# Patient Record
Sex: FEMALE | Race: WHITE | Employment: UNEMPLOYED | URBAN - NONMETROPOLITAN AREA
[De-identification: names, ages, dates, MRNs, and addresses within clinical notes are randomized per-mention and may not be internally consistent; named-entity substitution may affect disease eponyms.]

---

## 2020-08-13 ENCOUNTER — TELEPHONE (OUTPATIENT)
Dept: FAMILY MEDICINE CLINIC | Age: 45
End: 2020-08-13

## 2020-08-13 NOTE — TELEPHONE ENCOUNTER
1. Appt time and date. (give directions)   8/18/2020 @ 10am, REGLA Sousa  2. Arrive 15 min before appt. Confirmed   3. Please bring all medications to appt. Confirmed  4. Bring immunization record.   (if no record, which immunizations have you had and where?)  Confirmed

## 2020-08-19 ENCOUNTER — NURSE ONLY (OUTPATIENT)
Dept: LAB | Age: 45
End: 2020-08-19

## 2020-08-19 ENCOUNTER — OFFICE VISIT (OUTPATIENT)
Dept: FAMILY MEDICINE CLINIC | Age: 45
End: 2020-08-19

## 2020-08-19 VITALS
HEART RATE: 69 BPM | OXYGEN SATURATION: 99 % | WEIGHT: 194.8 LBS | TEMPERATURE: 97.9 F | SYSTOLIC BLOOD PRESSURE: 120 MMHG | HEIGHT: 63 IN | DIASTOLIC BLOOD PRESSURE: 68 MMHG | BODY MASS INDEX: 34.52 KG/M2 | RESPIRATION RATE: 16 BRPM

## 2020-08-19 PROBLEM — F31.9 AFFECTIVE PSYCHOSIS, BIPOLAR (HCC): Status: ACTIVE | Noted: 2020-08-19

## 2020-08-19 PROBLEM — F43.10 PTSD (POST-TRAUMATIC STRESS DISORDER): Status: ACTIVE | Noted: 2020-08-19

## 2020-08-19 PROBLEM — I10 ESSENTIAL HYPERTENSION: Status: ACTIVE | Noted: 2020-08-19

## 2020-08-19 PROBLEM — F17.210 CIGARETTE NICOTINE DEPENDENCE WITHOUT COMPLICATION: Status: ACTIVE | Noted: 2020-08-19

## 2020-08-19 PROBLEM — M06.9 RHEUMATOID ARTHRITIS INVOLVING MULTIPLE SITES (HCC): Status: ACTIVE | Noted: 2020-08-19

## 2020-08-19 PROBLEM — G43.109 MIGRAINE WITH AURA AND WITHOUT STATUS MIGRAINOSUS, NOT INTRACTABLE: Status: ACTIVE | Noted: 2020-08-19

## 2020-08-19 LAB
ALBUMIN SERPL-MCNC: 4.4 G/DL (ref 3.5–5.1)
ALP BLD-CCNC: 88 U/L (ref 38–126)
ALT SERPL-CCNC: 17 U/L (ref 11–66)
ANION GAP SERPL CALCULATED.3IONS-SCNC: 10 MEQ/L (ref 8–16)
AST SERPL-CCNC: 19 U/L (ref 5–40)
BASOPHILS # BLD: 1.6 %
BASOPHILS ABSOLUTE: 0.2 THOU/MM3 (ref 0–0.1)
BILIRUB SERPL-MCNC: 0.2 MG/DL (ref 0.3–1.2)
BUN BLDV-MCNC: 15 MG/DL (ref 7–22)
C-REACTIVE PROTEIN: 0.31 MG/DL (ref 0–1)
CALCIUM SERPL-MCNC: 9.1 MG/DL (ref 8.5–10.5)
CHLORIDE BLD-SCNC: 104 MEQ/L (ref 98–111)
CHOLESTEROL, FASTING: 126 MG/DL (ref 100–199)
CO2: 27 MEQ/L (ref 23–33)
CREAT SERPL-MCNC: 0.9 MG/DL (ref 0.4–1.2)
EOSINOPHIL # BLD: 5.3 %
EOSINOPHILS ABSOLUTE: 0.5 THOU/MM3 (ref 0–0.4)
ERYTHROCYTE [DISTWIDTH] IN BLOOD BY AUTOMATED COUNT: 12.6 % (ref 11.5–14.5)
ERYTHROCYTE [DISTWIDTH] IN BLOOD BY AUTOMATED COUNT: 47 FL (ref 35–45)
GFR SERPL CREATININE-BSD FRML MDRD: 67 ML/MIN/1.73M2
GLUCOSE BLD-MCNC: 76 MG/DL (ref 70–108)
HCT VFR BLD CALC: 44.7 % (ref 37–47)
HDLC SERPL-MCNC: 37 MG/DL
HEMOGLOBIN: 14.3 GM/DL (ref 12–16)
IMMATURE GRANS (ABS): 0.02 THOU/MM3 (ref 0–0.07)
IMMATURE GRANULOCYTES: 0.2 %
LDL CHOLESTEROL CALCULATED: 61 MG/DL
LYMPHOCYTES # BLD: 25.1 %
LYMPHOCYTES ABSOLUTE: 2.4 THOU/MM3 (ref 1–4.8)
MCH RBC QN AUTO: 31.9 PG (ref 26–33)
MCHC RBC AUTO-ENTMCNC: 32 GM/DL (ref 32.2–35.5)
MCV RBC AUTO: 99.8 FL (ref 81–99)
MONOCYTES # BLD: 7.7 %
MONOCYTES ABSOLUTE: 0.7 THOU/MM3 (ref 0.4–1.3)
NUCLEATED RED BLOOD CELLS: 0 /100 WBC
PLATELET # BLD: 366 THOU/MM3 (ref 130–400)
PMV BLD AUTO: 12.3 FL (ref 9.4–12.4)
POTASSIUM SERPL-SCNC: 3.8 MEQ/L (ref 3.5–5.2)
RBC # BLD: 4.48 MILL/MM3 (ref 4.2–5.4)
RHEUMATOID FACTOR: < 10 IU/ML (ref 0–13)
SEDIMENTATION RATE, ERYTHROCYTE: 8 MM/HR (ref 0–20)
SEG NEUTROPHILS: 60.1 %
SEGMENTED NEUTROPHILS ABSOLUTE COUNT: 5.8 THOU/MM3 (ref 1.8–7.7)
SODIUM BLD-SCNC: 141 MEQ/L (ref 135–145)
TOTAL CK: 210 U/L (ref 30–135)
TOTAL PROTEIN: 7.2 G/DL (ref 6.1–8)
TRIGLYCERIDE, FASTING: 140 MG/DL (ref 0–199)
TSH SERPL DL<=0.05 MIU/L-ACNC: 1.67 UIU/ML (ref 0.4–4.2)
URIC ACID: 4.4 MG/DL (ref 2.4–5.7)
WBC # BLD: 9.6 THOU/MM3 (ref 4.8–10.8)

## 2020-08-19 PROCEDURE — G8431 POS CLIN DEPRES SCRN F/U DOC: HCPCS | Performed by: NURSE PRACTITIONER

## 2020-08-19 PROCEDURE — G0444 DEPRESSION SCREEN ANNUAL: HCPCS | Performed by: NURSE PRACTITIONER

## 2020-08-19 PROCEDURE — 99204 OFFICE O/P NEW MOD 45 MIN: CPT | Performed by: NURSE PRACTITIONER

## 2020-08-19 RX ORDER — FLUOXETINE HYDROCHLORIDE 20 MG/1
20 CAPSULE ORAL DAILY
Qty: 30 CAPSULE | Refills: 5 | Status: SHIPPED | OUTPATIENT
Start: 2020-08-19 | End: 2020-09-17

## 2020-08-19 RX ORDER — MELOXICAM 7.5 MG/1
7.5 TABLET ORAL DAILY
Qty: 30 TABLET | Refills: 3 | Status: SHIPPED | OUTPATIENT
Start: 2020-08-19 | End: 2020-09-17

## 2020-08-19 RX ORDER — FLUOXETINE HYDROCHLORIDE 20 MG/1
40 CAPSULE ORAL DAILY
COMMUNITY
End: 2020-08-19 | Stop reason: SDUPTHER

## 2020-08-19 RX ORDER — PREDNISONE 10 MG/1
TABLET ORAL
Qty: 30 TABLET | Refills: 0 | Status: SHIPPED | OUTPATIENT
Start: 2020-08-19 | End: 2020-09-15 | Stop reason: SDUPTHER

## 2020-08-19 RX ORDER — ATENOLOL 50 MG/1
50 TABLET ORAL DAILY
COMMUNITY
End: 2020-08-19 | Stop reason: SDUPTHER

## 2020-08-19 RX ORDER — LISINOPRIL AND HYDROCHLOROTHIAZIDE 25; 20 MG/1; MG/1
1 TABLET ORAL DAILY
Qty: 30 TABLET | Refills: 5 | Status: SHIPPED | OUTPATIENT
Start: 2020-08-19 | End: 2020-12-29 | Stop reason: SDUPTHER

## 2020-08-19 RX ORDER — TOPIRAMATE 25 MG/1
25 TABLET ORAL 2 TIMES DAILY
Qty: 60 TABLET | Refills: 3 | Status: SHIPPED | OUTPATIENT
Start: 2020-08-19 | End: 2020-12-14 | Stop reason: SDUPTHER

## 2020-08-19 RX ORDER — HYDROXYZINE HYDROCHLORIDE 25 MG/1
25 TABLET, FILM COATED ORAL EVERY 6 HOURS PRN
Qty: 60 TABLET | Refills: 0 | Status: SHIPPED | OUTPATIENT
Start: 2020-08-19 | End: 2020-12-29 | Stop reason: SDUPTHER

## 2020-08-19 RX ORDER — TOPIRAMATE 100 MG/1
100 TABLET, FILM COATED ORAL DAILY
COMMUNITY
End: 2020-08-19 | Stop reason: SDUPTHER

## 2020-08-19 RX ORDER — OLANZAPINE 2.5 MG/1
2.5 TABLET ORAL NIGHTLY
Qty: 30 TABLET | Refills: 3 | Status: SHIPPED | OUTPATIENT
Start: 2020-08-19 | End: 2020-09-17

## 2020-08-19 RX ORDER — ATENOLOL 50 MG/1
50 TABLET ORAL DAILY
Qty: 90 TABLET | Refills: 3 | Status: SHIPPED | OUTPATIENT
Start: 2020-08-19 | End: 2020-12-29 | Stop reason: SDUPTHER

## 2020-08-19 ASSESSMENT — PATIENT HEALTH QUESTIONNAIRE - PHQ9
5. POOR APPETITE OR OVEREATING: 3
7. TROUBLE CONCENTRATING ON THINGS, SUCH AS READING THE NEWSPAPER OR WATCHING TELEVISION: 3
9. THOUGHTS THAT YOU WOULD BE BETTER OFF DEAD, OR OF HURTING YOURSELF: 0
10. IF YOU CHECKED OFF ANY PROBLEMS, HOW DIFFICULT HAVE THESE PROBLEMS MADE IT FOR YOU TO DO YOUR WORK, TAKE CARE OF THINGS AT HOME, OR GET ALONG WITH OTHER PEOPLE: 3
1. LITTLE INTEREST OR PLEASURE IN DOING THINGS: 3
6. FEELING BAD ABOUT YOURSELF - OR THAT YOU ARE A FAILURE OR HAVE LET YOURSELF OR YOUR FAMILY DOWN: 0
SUM OF ALL RESPONSES TO PHQ9 QUESTIONS 1 & 2: 6
8. MOVING OR SPEAKING SO SLOWLY THAT OTHER PEOPLE COULD HAVE NOTICED. OR THE OPPOSITE, BEING SO FIGETY OR RESTLESS THAT YOU HAVE BEEN MOVING AROUND A LOT MORE THAN USUAL: 0
SUM OF ALL RESPONSES TO PHQ QUESTIONS 1-9: 18
3. TROUBLE FALLING OR STAYING ASLEEP: 3
4. FEELING TIRED OR HAVING LITTLE ENERGY: 3
SUM OF ALL RESPONSES TO PHQ QUESTIONS 1-9: 18
2. FEELING DOWN, DEPRESSED OR HOPELESS: 3
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
## 2020-08-19 NOTE — PATIENT INSTRUCTIONS
the four sessions.   Tobacco cessation products are not included in the cost and are not provided by [facility].     [address] [place] [address] Puneet

## 2020-08-20 ENCOUNTER — TELEPHONE (OUTPATIENT)
Dept: FAMILY MEDICINE CLINIC | Age: 45
End: 2020-08-20

## 2020-08-21 LAB — CYCLIC CITRULLINATED PEPTIDE ANTIBODY IGG: < 1.5 U/ML

## 2020-08-22 LAB — ANA SCREEN: DETECTED

## 2020-08-25 ENCOUNTER — TELEPHONE (OUTPATIENT)
Dept: FAMILY MEDICINE CLINIC | Age: 45
End: 2020-08-25

## 2020-08-25 NOTE — TELEPHONE ENCOUNTER
----- Message from GOLDEN Cates CNP sent at 8/25/2020  7:43 AM EDT -----  Let Sona Esquivel know her NEL was positive. The NEL is often positive in rheumatological diseases.  There are some additional testing that will be done though on the NEL

## 2020-08-26 NOTE — TELEPHONE ENCOUNTER
Pt called back stating voiced understanding of NEL testing and awaiting results    Also wanted to note a previous question concerning her drinking habits. ..the patient denies consumption of alcohol other that maybe a glass of wine every 6 mo or so and even then she does not finish the glass.   PT denies drinking on a regular basis

## 2020-09-15 NOTE — TELEPHONE ENCOUNTER
Future Appointments   Date Time Provider Eunice Breen   9/17/2020  9:00 AM [address] [facility]       Recent Visits  Date Type Provider Dept   08/19/20 Office Visit Jenifer Aguirre, GOLDEN Powell CNP [facility]   Showing recent visits within past 540 days with a meds authorizing provider and meeting all other requirements     Future Appointments  Date Type Provider Dept   09/17/20 Appointment GOLDEN Duran CNP [facility]   Showing future appointments within next 150 days with a meds authorizing provider and meeting all other requirements

## 2020-09-16 RX ORDER — PREDNISONE 10 MG/1
10 TABLET ORAL DAILY
Qty: 30 TABLET | Refills: 0 | Status: SHIPPED | OUTPATIENT
Start: 2020-09-16 | End: 2020-11-18 | Stop reason: SDUPTHER

## 2020-09-17 ENCOUNTER — TELEPHONE (OUTPATIENT)
Dept: FAMILY MEDICINE CLINIC | Age: 45
End: 2020-09-17

## 2020-09-17 ENCOUNTER — NURSE ONLY (OUTPATIENT)
Dept: LAB | Age: 45
End: 2020-09-17

## 2020-09-17 ENCOUNTER — OFFICE VISIT (OUTPATIENT)
Dept: FAMILY MEDICINE CLINIC | Age: 45
End: 2020-09-17

## 2020-09-17 VITALS
HEIGHT: 63 IN | HEART RATE: 84 BPM | WEIGHT: 196.8 LBS | SYSTOLIC BLOOD PRESSURE: 128 MMHG | RESPIRATION RATE: 16 BRPM | OXYGEN SATURATION: 99 % | TEMPERATURE: 98.1 F | BODY MASS INDEX: 34.87 KG/M2 | DIASTOLIC BLOOD PRESSURE: 86 MMHG

## 2020-09-17 LAB
AMPHETAMINE+METHAMPHETAMINE URINE SCREEN: NEGATIVE
BARBITURATE QUANTITATIVE URINE: NEGATIVE
BENZODIAZEPINE QUANTITATIVE URINE: NEGATIVE
CANNABINOID QUANTITATIVE URINE: NEGATIVE
COCAINE METABOLITE QUANTITATIVE URINE: NEGATIVE
OPIATES, URINE: NEGATIVE
OXYCODONE: NEGATIVE
PHENCYCLIDINE QUANTITATIVE URINE: NEGATIVE
TOTAL CK: 169 U/L (ref 30–135)

## 2020-09-17 PROCEDURE — 99214 OFFICE O/P EST MOD 30 MIN: CPT | Performed by: NURSE PRACTITIONER

## 2020-09-17 RX ORDER — FLUOXETINE HYDROCHLORIDE 40 MG/1
40 CAPSULE ORAL DAILY
Qty: 30 CAPSULE | Refills: 3 | Status: SHIPPED | OUTPATIENT
Start: 2020-09-17 | End: 2020-12-29 | Stop reason: SDUPTHER

## 2020-09-17 RX ORDER — CLONAZEPAM 0.5 MG/1
0.5 TABLET ORAL 2 TIMES DAILY PRN
Qty: 30 TABLET | Refills: 0 | Status: SHIPPED | OUTPATIENT
Start: 2020-09-17 | End: 2020-11-18 | Stop reason: ALTCHOICE

## 2020-09-17 RX ORDER — MELOXICAM 15 MG/1
15 TABLET ORAL DAILY
Qty: 30 TABLET | Refills: 5 | Status: SHIPPED | OUTPATIENT
Start: 2020-09-17 | End: 2020-12-29 | Stop reason: SDUPTHER

## 2020-09-17 RX ORDER — OLANZAPINE 5 MG/1
5 TABLET ORAL NIGHTLY
Qty: 30 TABLET | Refills: 3 | Status: SHIPPED | OUTPATIENT
Start: 2020-09-17 | End: 2020-12-29 | Stop reason: SDUPTHER

## 2020-09-17 NOTE — TELEPHONE ENCOUNTER
----- Message from [employer] , APRN - CNP sent at 9/17/2020  2:27 PM EDT -----  Let Josesitoeliazar Menendez know her urine drug screen was clean. I am sending her Rx in for the klonopin.

## 2020-09-17 NOTE — TELEPHONE ENCOUNTER
----- Message from GOLDEN Hopper CNP sent at 9/17/2020  3:53 PM EDT -----  Let Liliam Kayy know the CK level is improved, down to 169. No further testing for this required.

## 2020-09-17 NOTE — PROGRESS NOTES
[facility] Medicine at [address] [facility], . [address]  Chief Complaint   Patient presents with    Follow-up     Pt presents for a 4 week f/u for chronic med conditions. She hasn't gotten the XR done. She hasn't seen a difference since she changed the bipolar meds. History obtained from chart review and the patient. SUBJECTIVE:  Donnal Dakin is a 50 y.o. female that presents today for follow up chronic conditions    Bipolar Disorder  Since last visit, she has started on Prozac 20 mg and Zyprexa 2.5 mg. Moods are not doing very well, increased irritability. She notes that the Zyprexa does make her sleepy, but she doesn't note any improvement. She has had about 2 manic episodes since last appt, but currently feeling depressed. The manic episodes have lasted for about 1 week. Denies any SI/HI. Denies any side effects at this point. She tried to have VV with [facility] but had technica difficulties. She is still struggling with high anxiety. She tried the Vistaril and it didn't seem to work. She feels like she would require the klonopin twice a day. RA  Previously diagnosed with RA by rheumatologist. She is originally from [place] area. She reports that she was on methotrexate and Humira injections in the past for RA. She lost insurance and then had difficulty paying for the medications. She reports that main joints affected are lower extremity, mostly hips, feet and ankles and low back. She has been taking Mobic for pain and she recently completed steroid taper. She did get relief with the steroids and she would like to get more steroids.     Age/Gender Health Maintenance    Lipid - 40  DM Screen - 40  Colon Cancer Screening - 48  Lung Cancer Screening (Age 54 to [de-identified] with 30 pack year hx, current smoker or quit within past 15 years) - n/a    Tetanus - needs  Influenza Vaccine - yearly  Pneumonia Vaccine - 65  Zostavax - 50   HPV Vaccine - n/a    Breast Cancer Screening - 50  Cervical Cancer Screening - 21  Osteoporosis Screening - 72  Chlamydia Screen - n/a    AAA Screening - n/a    Falls screening - n/a    Current Outpatient Medications   Medication Sig Dispense Refill    meloxicam (MOBIC) 15 MG tablet Take 1 tablet by mouth daily 30 tablet 5    OLANZapine (ZYPREXA) 5 MG tablet Take 1 tablet by mouth nightly 30 tablet 3    predniSONE (DELTASONE) 10 MG tablet Take 1 tablet by mouth daily 30 tablet 0    atenolol (TENORMIN) 50 MG tablet Take 1 tablet by mouth daily 90 tablet 3    FLUoxetine (PROZAC) 20 MG capsule Take 1 capsule by mouth daily 30 capsule 5    topiramate (TOPAMAX) 25 MG tablet Take 1 tablet by mouth 2 times daily 60 tablet 3    lisinopril-hydroCHLOROthiazide (PRINZIDE;ZESTORETIC) 20-25 MG per tablet Take 1 tablet by mouth daily 30 tablet 5    hydrOXYzine (ATARAX) 25 MG tablet Take 1 tablet by mouth every 6 hours as needed for Anxiety 60 tablet 0     No current facility-administered medications for this visit. Orders Placed This Encounter   Medications    meloxicam (MOBIC) 15 MG tablet     Sig: Take 1 tablet by mouth daily     Dispense:  30 tablet     Refill:  5    OLANZapine (ZYPREXA) 5 MG tablet     Sig: Take 1 tablet by mouth nightly     Dispense:  30 tablet     Refill:  3         All medications reviewed and reconciled, including OTC and herbal medications. Updated list given to patient.        Patient Active Problem List   Diagnosis    Affective psychosis, bipolar ([facility] [address]    PTSD (post-traumatic stress disorder)    Essential hypertension    Migraine with aura and without status migrainosus, not intractable    Cigarette nicotine dependence without complication    Rheumatoid arthritis involving multiple sites [facility])       Past Medical History:   Diagnosis Date    Anxiety     Bipolar disorder ([facility] [address]     Depression     Hypertension     Rheumatoid arteritis ([facility] [address]        Past Surgical History:   Procedure Laterality Date    HYSTERECTOMY, TOTAL ABDOMINAL No Known Allergies    Social History     Socioeconomic History    Marital status:      Spouse name: Not on file    Number of children: Not on file    Years of education: Not on file    Highest education level: Not on file   Occupational History    Not on file   Social Needs    Financial resource strain: Not on file    Food insecurity     Worry: Not on file     Inability: Not on file    Transportation needs     Medical: Not on file     Non-medical: Not on file   Tobacco Use    Smoking status: Current Every Day Smoker     Packs/day: 1.00     Years: 2.00     Pack years: 2.00     Types: Cigarettes     Start date: 8/19/2018    Smokeless tobacco: Never Used   Substance and Sexual Activity    Alcohol use: Not on file    Drug use: Not on file    Sexual activity: Not on file   Lifestyle    Physical activity     Days per week: Not on file     Minutes per session: Not on file    Stress: Not on file   Relationships    Social connections     Talks on phone: Not on file     Gets together: Not on file     Attends [employer] service: Not on file     Active member of club or organization: Not on file     Attends meetings of clubs or organizations: Not on file     Relationship status: Not on file    Intimate partner violence     Fear of current or ex partner: Not on file     Emotionally abused: Not on file     Physically abused: Not on file     Forced sexual activity: Not on file   Other Topics Concern    Not on file   Social History Narrative    Not on file       No family history on file. I have reviewed the patient's past medical history, past surgical history, allergies, medications, social and family history and I have made updates where appropriate.       Review of Systems  Positive responses are highlighted in bold    Constitutional:  Fever, Chills, Night Sweats, Fatigue, Unexpected changes in weight  Eyes:  Eye discharge, Eye pain, Eye redness, Visual disturbances   HENT:  Ear pain, Tinnitus, Nosebleeds, Trouble swallowing, Hearing loss, Sore throat  Cardiovascular:  Chest Pain, Palpitations, Orthopnea, Paroxysmal Nocturnal Dyspnea  Respiratory:  Cough, Wheezing, Shortness of breath, Chest tightness, Apnea  Gastrointestinal:  Nausea, Vomiting, Diarrhea, Constipation, Heartburn, Blood in stool  Genitourinary:  Difficulty or painful urination, Flank pain, Change in frequency, Urgency  Skin:  Color change, Rash, Itching, Wound  Psychiatric:  Hallucinations, Anxiety, Depression, Suicidal ideation  Hematological:  Enlarged glands, Easy bleeding, Easily bruising  Musculoskeletal:  Joint pain, Back pain, Gait problems, Joint swelling, Myalgias  Neurological:  Dizziness, Headaches, Presyncope, Numbness, Seizures, Tremors  Allergy:  Environmental allergies, Food allergies  Endocrine:  Heat Intolerance, Cold Intolerance, Polydipsia, Polyphagia, Polyuria    Lab Results   Component Value Date     08/19/2020    K 3.8 08/19/2020     08/19/2020    CO2 27 08/19/2020    BUN 15 08/19/2020    CREATININE 0.9 08/19/2020    GLUCOSE 76 08/19/2020    CALCIUM 9.1 08/19/2020    PROT 7.2 08/19/2020    LABALBU 4.4 08/19/2020    BILITOT 0.2 (L) 08/19/2020    ALKPHOS 88 08/19/2020    AST 19 08/19/2020    ALT 17 08/19/2020    LABGLOM 67 (A) 08/19/2020     Lab Results   Component Value Date    WBC 9.6 08/19/2020    HGB 14.3 08/19/2020    HCT 44.7 08/19/2020    MCV 99.8 (H) 08/19/2020     08/19/2020     Lab Results   Component Value Date    TSH 1.670 08/19/2020     CRP-0.31    Total CK-210    Sed rate-8  NEL-positive  Rheumatoid factor-<10  CC peptide IgG- < 1.5    PHYSICAL EXAM:  Vitals:    09/17/20 0911   BP: 128/86   Pulse: 84   Resp: 16   Temp: 98.1 °F (36.7 °C)   SpO2: 99%   Weight: 196 lb 12.8 oz (89.3 kg)   Height: 5' 2.5\" (1.588 m)     Body mass index is 35.42 kg/m².          VS Reviewed  General Appearance: A&O x 3, No acute distress,well developed and well- nourished  Head: normocephalic and atraumatic  Eyes: pupils equal, round, and reactive to light, extraocular eye movements intact, conjunctivae and eye lids without erythema  ENT: external ear and ear canal clear bilaterally, TMs intact and regular, nose without deformity, nasal mucosa and turbinates normal without polyps, oropharynx normal, dentition is normal for age  Neck: supple and non-tender without mass, no thyromegaly or thyroid nodules, no cervical lymphadenopathy  Pulmonary/Chest: clear to auscultation bilaterally- no wheezes, rales or rhonchi, normal air movement, no respiratory distress or retractions  Cardiovascular: S1 and S2 auscultated w/ RRR. No murmurs, rubs, clicks, or gallops, distal pulses intact. Abdomen: soft, non-tender, non-distended, bowl sounds physiologic,  no rebound or guarding, no masses or hernias noted. Liver and spleen without enlargement. Extremities: no cyanosis, clubbing or edema of the lower extremities  Musculoskeletal: No joint swelling or gross deformity   Neuro:  Alert, 5/5 strength globally and symmetrically  Psych: Affect and mood are flat and depressed. Thought process is normal without evidence of depression or psychosis. Good insight and appropriate interaction. Cognition and memory appear to be intact. Skin: warm and dry, no rash or erythema  Lymph:  No cervical, auricular or supraclavicular lymph nodes palpated      ASSESSMENT & PLAN  Ronny Cohen was seen today for follow-up. Diagnoses and all orders for this visit:    PTSD (post-traumatic stress disorder)  -     OLANZapine (ZYPREXA) 5 MG tablet; Take 1 tablet by mouth nightly    Bipolar disorder, current episode depressed, severe, without psychotic features ([address]  -     OLANZapine (ZYPREXA) 5 MG tablet; Take 1 tablet by mouth nightly    Rheumatoid arthritis involving multiple sites, unspecified rheumatoid factor presence (HCC)  -     XR LUMBAR SPINE (2-3 VIEWS); Future  -     meloxicam (MOBIC) 15 MG tablet;  Take 1 tablet by mouth daily    Chronic

## 2020-09-17 NOTE — PROGRESS NOTES
Controlled Substance Monitoring:    Acute and Chronic Pain Monitoring:   RX Monitoring 9/17/2020   Periodic Controlled Substance Monitoring No signs of potential drug abuse or diversion identified.

## 2020-09-19 LAB — ANA SCREEN: DETECTED

## 2020-11-17 ENCOUNTER — PATIENT MESSAGE (OUTPATIENT)
Dept: FAMILY MEDICINE CLINIC | Age: 45
End: 2020-11-17
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
## 2020-11-18 RX ORDER — PREDNISONE 10 MG/1
10 TABLET ORAL DAILY
Qty: 30 TABLET | Refills: 0 | Status: SHIPPED | OUTPATIENT
Start: 2020-11-18 | End: 2021-01-15 | Stop reason: ALTCHOICE

## 2020-11-18 RX ORDER — DIAZEPAM 2 MG/1
2 TABLET ORAL EVERY 12 HOURS PRN
Qty: 30 TABLET | Refills: 0 | Status: SHIPPED | OUTPATIENT
Start: 2020-11-18 | End: 2021-01-15 | Stop reason: SDUPTHER

## 2020-11-18 NOTE — TELEPHONE ENCOUNTER
Controlled Substance Monitoring:    Acute and Chronic Pain Monitoring:   RX Monitoring 11/18/2020   Periodic Controlled Substance Monitoring No signs of potential drug abuse or diversion identified.

## 2020-11-23 ENCOUNTER — VIRTUAL VISIT (OUTPATIENT)
Dept: FAMILY MEDICINE CLINIC | Age: 45
End: 2020-11-23

## 2020-11-23 ENCOUNTER — TELEPHONE (OUTPATIENT)
Dept: FAMILY MEDICINE CLINIC | Age: 45
End: 2020-11-23

## 2020-11-23 PROCEDURE — 99214 OFFICE O/P EST MOD 30 MIN: CPT | Performed by: NURSE PRACTITIONER

## 2020-11-23 RX ORDER — RABEPRAZOLE SODIUM 20 MG/1
20 TABLET, DELAYED RELEASE ORAL DAILY
Qty: 30 TABLET | Refills: 3 | Status: SHIPPED | OUTPATIENT
Start: 2020-11-23 | End: 2020-12-14 | Stop reason: ALTCHOICE

## 2020-11-23 RX ORDER — CHLORHEXIDINE GLUCONATE 0.12 MG/ML
15 RINSE ORAL 2 TIMES DAILY
Qty: 420 ML | Refills: 0 | Status: SHIPPED | OUTPATIENT
Start: 2020-11-23 | End: 2020-12-07

## 2020-11-23 RX ORDER — AMOXICILLIN 500 MG/1
500 CAPSULE ORAL 3 TIMES DAILY
Qty: 30 CAPSULE | Refills: 0 | Status: SHIPPED | OUTPATIENT
Start: 2020-11-23 | End: 2020-12-03

## 2020-11-23 ASSESSMENT — ENCOUNTER SYMPTOMS
TROUBLE SWALLOWING: 0
SORE THROAT: 0
EYE PAIN: 0
SHORTNESS OF BREATH: 0
RHINORRHEA: 0
DIARRHEA: 0
COLOR CHANGE: 0
COUGH: 0
VOMITING: 0
ABDOMINAL PAIN: 0
WHEEZING: 0
NAUSEA: 0
EYE REDNESS: 0

## 2020-11-23 NOTE — PROGRESS NOTES
immunocompromised state. Neurological: Negative for dizziness, tremors, syncope and headaches. Hematological: Negative. Negative for adenopathy. Psychiatric/Behavioral: Negative for behavioral problems, confusion, self-injury and suicidal ideas. All other systems reviewed and are negative. Prior to Visit Medications    Medication Sig Taking? Authorizing Provider   amoxicillin (AMOXIL) 500 MG capsule Take 1 capsule by mouth 3 times daily for 10 days Yes GOLDEN Segura CNP   Magic Mouthwash (MIRACLE MOUTHWASH) Swish and spit 5 mLs 4 times daily as needed for Dental Pain Equal parts of nystatin, lidocaine and nystatin Yes GOLDEN Segura CNP   chlorhexidine (PERIDEX) 0.12 % solution Take 15 mLs by mouth 2 times daily for 14 days Yes GOLDEN Segura CNP   RABEprazole (ACIPHEX) 20 MG tablet Take 1 tablet by mouth daily Yes GOLDEN Segura CNP   predniSONE (DELTASONE) 10 MG tablet Take 1 tablet by mouth daily  GOLDEN Segura CNP   diazePAM (VALIUM) 2 MG tablet Take 1 tablet by mouth every 12 hours as needed for Anxiety for up to 30 days.   GOLDEN Segura CNP   meloxicam (MOBIC) 15 MG tablet Take 1 tablet by mouth daily  GOLDEN Segura CNP   OLANZapine (ZYPREXA) 5 MG tablet Take 1 tablet by mouth nightly  GOLDEN Segura CNP   FLUoxetine (PROZAC) 40 MG capsule Take 1 capsule by mouth daily  GOLDEN Segura CNP   atenolol (TENORMIN) 50 MG tablet Take 1 tablet by mouth daily  GOLDEN Segura CNP   topiramate (TOPAMAX) 25 MG tablet Take 1 tablet by mouth 2 times daily  GOLDEN Segura CNP   lisinopril-hydroCHLOROthiazide (PRINZIDE;ZESTORETIC) 20-25 MG per tablet Take 1 tablet by mouth daily  GOLDEN Segura CNP   hydrOXYzine (ATARAX) 25 MG tablet Take 1 tablet by mouth every 6 hours as needed for Anxiety  GOLDEN Segura CNP       Social History     Tobacco Use    Smoking status: Current Every Day Smoker     Packs/day: 1.00     Years: 2.00     Pack years: 2.00     Types: Cigarettes     Start date: 8/19/2018    Smokeless tobacco: Never Used   Substance Use Topics    Alcohol use: Not on file    Drug use: Not on file        No Known Allergies,   Past Medical History:   Diagnosis Date    Anxiety     Bipolar disorder ([facility] [address]     Depression     Hypertension     Rheumatoid arteritis ([facility] [address]    ,   Past Surgical History:   Procedure Laterality Date    HYSTERECTOMY, TOTAL ABDOMINAL     , No family history on file.,   There is no immunization history on file for this patient.,   Health Maintenance   Topic Date Due    Pneumococcal 0-64 years Vaccine (1 of 1 - PPSV23) 07/17/1979    HIV screen  07/17/1988    DTaP/Tdap/Td vaccine (1 - Tdap) 07/17/1992    Flu vaccine (1) 09/01/2020    Potassium monitoring  08/19/2021    Creatinine monitoring  08/19/2021    Lipid screen  08/19/2025    Hepatitis A vaccine  Aged Out    Hepatitis B vaccine  Aged Out    Hib vaccine  Aged Out    Meningococcal (ACWY) vaccine  Aged Out       PHYSICAL EXAMINATION:  [ INSTRUCTIONS:  \"[x]\" Indicates a positive item  \"[]\" Indicates a negative item  -- DELETE ALL ITEMS NOT EXAMINED]  Vital Signs: (As obtained by patient/caregiver or practitioner observation)    Blood pressure-  Heart rate-    Respiratory rate-    Temperature-  Pulse oximetry-     Constitutional: [x] Appears well-developed and well-nourished [x] No apparent distress      [] Abnormal-   Mental status  [x] Alert and awake  [x] Oriented to person/place/time [x]Able to follow commands      Eyes:  EOM    [x]  Normal  [] Abnormal-  Sclera  [x]  Normal  [] Abnormal -         Discharge [x]  None visible  [] Abnormal -    HENT:   [x] Normocephalic, atraumatic.   [] Abnormal   [x] Mouth/Throat: Mucous membranes are moist.     External Ears [x] Normal  [] Abnormal-     Neck: [x] No visualized mass     Pulmonary/Chest: [x] Respiratory effort normal.  [x] No visualized signs of difficulty breathing or this being a TeleHealth encounter (During LAI-24 public health emergency), evaluation of the following organ systems was limited: Vitals/Constitutional/EENT/Resp/CV/GI//MS/Neuro/Skin/Heme-Lymph-Imm. Pursuant to the emergency declaration under the [address] [place] [address], [address] [place] [address] and the [employer] Act, this Virtual Visit was conducted with patient's (and/or legal guardian's) consent, to reduce the patient's risk of exposure to COVID-19 and provide necessary medical care. The patient (and/or legal guardian) has also been advised to contact this office for worsening conditions or problems, and seek emergency medical treatment and/or call 911 if deemed necessary. Services were provided through a video synchronous discussion virtually to substitute for in-person clinic visit. Patient and provider were located at their individual homes. --Daniella Osorio, GOLDEN - CNP on 11/23/2020 at 2:25 PM    An electronic signature was used to authenticate this note.

## 2020-11-25 ENCOUNTER — TELEPHONE (OUTPATIENT)
Dept: FAMILY MEDICINE CLINIC | Age: 45
End: 2020-11-25

## 2020-11-25 PROBLEM — R10.13 DYSPEPSIA: Status: ACTIVE | Noted: 2020-11-25

## 2020-11-25 RX ORDER — PANTOPRAZOLE SODIUM 40 MG/1
40 TABLET, DELAYED RELEASE ORAL
Qty: 30 TABLET | Refills: 5 | Status: SHIPPED | OUTPATIENT
Start: 2020-11-25 | End: 2020-12-14 | Stop reason: SDUPTHER

## 2020-11-25 NOTE — TELEPHONE ENCOUNTER
PA DENIAL RABEprazole (ACIPHEX) 20 MG tablet  11/25/20. Pt must try and fail (2) of the following for  Months : OMEPRAZOLE  CAPS ,PANTOPRAZOLE TABS, FIRST OMEPRAZOLE SUSPENSION, FIRST LANSOPRAZOLE SUSPENSION .      See attached for details

## 2020-12-04 ENCOUNTER — PATIENT MESSAGE (OUTPATIENT)
Dept: FAMILY MEDICINE CLINIC | Age: 45
End: 2020-12-04

## 2020-12-07 NOTE — TELEPHONE ENCOUNTER
From: Siena Oneal  To: Laclaudineeliazar Louis APRN - CNP  Sent: 12/4/2020 12:52 PM EST  Subject: Prescription Question    Hi Dr Yamil Hu you are doing well on this beautiful Friday afternoon. I am still feeling under the weather with a horrible cough and congested chest. I have tried to fight it off naturally, along with the antibiotics you ordered for my tooth. But I don't know if I am going to be able to get this stuff out of my lungs. I'm constantly coughing and spitting up very little flem. It's a very thick and deep consistency yellowish some white, some darker color. Haven't been tested for covid-19. N if I look down I can feel 2 knots on my throat. Last winter, I took breathing treatments at the er with similar symptoms in [facility] along w some other prescriptions. But I am afraid to go anywhere now.  Thanks so much [employer]

## 2020-12-08 ENCOUNTER — PATIENT MESSAGE (OUTPATIENT)
Dept: FAMILY MEDICINE CLINIC | Age: 45
End: 2020-12-08

## 2020-12-08 RX ORDER — AZITHROMYCIN 250 MG/1
250 TABLET, FILM COATED ORAL SEE ADMIN INSTRUCTIONS
Qty: 6 TABLET | Refills: 0 | Status: SHIPPED | OUTPATIENT
Start: 2020-12-08 | End: 2020-12-09 | Stop reason: SDUPTHER

## 2020-12-08 NOTE — TELEPHONE ENCOUNTER
From: Bree Arciniega  To: GOLDEN Navas CNP  Sent: 12/8/2020 2:07 PM EST  Subject: Prescription Question    I have one more question. Ya know I have not been feeling well. I've also been feeling dizzy. I just threw up about 10 min ago.y vision gets blurry n I throw up. I just checkedy bp and it's 173 over 110 pulse 104.       ----- Message -----   From:GOLDEN Jones CNP   Sent:12/8/2020 12:34 PM EST   To:Diana Talbot   Subject:RE: Prescription Question    Hi there Diana,    What I will do is send an antibiotic to your pharmacy. If you're not improving, or if you feel like you're worsening, I would definitely need to see you via a video visit. You should also quarantine in case this is COVID, recommend 10 days from onset of symptoms. Lise Hurtado      ----- Message -----   From:Diana Talbot   Sent:12/4/2020 12:52 PM EST   To:GOLDEN Jones CNP   Subject:Prescription Question    Hiren Riley you are doing well on this beautiful Friday afternoon. I am still feeling under the weather with a horrible cough and congested chest. I have tried to fight it off naturally, along with the antibiotics you ordered for my tooth. But I don't know if I am going to be able to get this stuff out of my lungs. I'm constantly coughing and spitting up very little flem. It's a very thick and deep consistency yellowish some white, some darker color. Haven't been tested for covid-19. N if I look down I can feel 2 knots on my throat. Last winter, I took breathing treatments at the er with similar symptoms in [facility] along w some other prescriptions. But I am afraid to go anywhere now.  Thanks so much [employer]

## 2020-12-09 ENCOUNTER — VIRTUAL VISIT (OUTPATIENT)
Dept: FAMILY MEDICINE CLINIC | Age: 45
End: 2020-12-09

## 2020-12-09 PROCEDURE — 99213 OFFICE O/P EST LOW 20 MIN: CPT | Performed by: NURSE PRACTITIONER

## 2020-12-09 RX ORDER — AZITHROMYCIN 250 MG/1
250 TABLET, FILM COATED ORAL SEE ADMIN INSTRUCTIONS
Qty: 6 TABLET | Refills: 0 | Status: SHIPPED | OUTPATIENT
Start: 2020-12-09 | End: 2020-12-14

## 2020-12-09 RX ORDER — ALBUTEROL SULFATE 90 UG/1
2 AEROSOL, METERED RESPIRATORY (INHALATION) 4 TIMES DAILY PRN
Qty: 1 INHALER | Refills: 1 | Status: SHIPPED | OUTPATIENT
Start: 2020-12-09 | End: 2020-12-29 | Stop reason: SDUPTHER

## 2020-12-09 RX ORDER — BENZONATATE 100 MG/1
100-200 CAPSULE ORAL 3 TIMES DAILY PRN
Qty: 42 CAPSULE | Refills: 0 | Status: SHIPPED | OUTPATIENT
Start: 2020-12-09 | End: 2020-12-16

## 2020-12-09 ASSESSMENT — ENCOUNTER SYMPTOMS
WHEEZING: 0
NAUSEA: 1
RHINORRHEA: 1
EYE REDNESS: 0
ABDOMINAL PAIN: 0
CHEST TIGHTNESS: 1
DIARRHEA: 0
TROUBLE SWALLOWING: 0
SHORTNESS OF BREATH: 0
SORE THROAT: 0
EYE PAIN: 0
VOMITING: 0
COLOR CHANGE: 0
COUGH: 1

## 2020-12-09 NOTE — PROGRESS NOTES
2020    TELEHEALTH EVALUATION -- Audio/Visual (During -04 public health emergency)    HPI:    Elen Genao (:  1975) has requested an audio/video evaluation for the following concern(s):    URI Symptoms    HPI:      Symptoms have been present for 2 week(s). Symptoms are unchanged since they initially started. She has lost her sense of taste and smell about 1 week ago. She was having a lot of dizziness yesterday and today. She feels like she is spinning and the room is spinning. She checked her BP and it was actually 173/143    Fever? No  Runny nose or congestion? Yes   Cough? Yes - productive cough  Sore throat? Yes  Headache, fatigue, joint pains, muscle aches? Yes  Shortness of breath/Wheezing? No  Nausea/Vomiting/Diarrhea? Yes - nausea  Double Sickening? No  Sick contacts? Yes - sister sick, other family members also sick. None of them have been tested with COVID though. She also has pain in her right side when she coughs or moves. Patient has tried OTC meds without improvement. Review of Systems   Constitutional: Positive for chills, diaphoresis, fatigue and fever. HENT: Positive for congestion and rhinorrhea. Negative for sore throat and trouble swallowing. Eyes: Negative for pain, redness and visual disturbance. Respiratory: Positive for cough and chest tightness. Negative for shortness of breath and wheezing. Cardiovascular: Positive for chest pain. Negative for palpitations and leg swelling. Gastrointestinal: Positive for nausea. Negative for abdominal pain, diarrhea and vomiting. Endocrine: Negative for polydipsia, polyphagia and polyuria. Genitourinary: Negative for decreased urine volume, dysuria, frequency and urgency. Musculoskeletal: Negative for arthralgias and myalgias. Skin: Negative for color change and rash. Allergic/Immunologic: Negative for environmental allergies, food allergies and immunocompromised state.    Neurological: Positive for dizziness and headaches. Negative for tremors and syncope. Hematological: Negative. Negative for adenopathy. Psychiatric/Behavioral: Negative for behavioral problems, confusion, self-injury and suicidal ideas. All other systems reviewed and are negative. Prior to Visit Medications    Medication Sig Taking? Authorizing Provider   benzonatate (TESSALON) 100 MG capsule Take 1-2 capsules by mouth 3 times daily as needed for Cough Yes GOLDEN Lozano CNP   azithromycin (ZITHROMAX) 250 MG tablet Take 1 tablet by mouth See Admin Instructions for 5 days 500mg on day 1 followed by 250mg on days 2 - 5 Yes GOLDEN Lozano CNP   albuterol sulfate HFA (VENTOLIN HFA) 108 (90 Base) MCG/ACT inhaler Inhale 2 puffs into the lungs 4 times daily as needed for Wheezing Yes GOLDEN Lozano CNP   pantoprazole (PROTONIX) 40 MG tablet Take 1 tablet by mouth every morning (before breakfast)  GOLDEN Lozano CNP   RABEprazole (ACIPHEX) 20 MG tablet Take 1 tablet by mouth daily  GOLDEN Lozano CNP   Magic Mouthwash (MIRACLE MOUTHWASH) Swish and spit 5 mLs 4 times daily as needed for Dental Pain Dispense equal parts of benadryl, lidocaine and nystatin  GOLDEN Lozano CNP   predniSONE (DELTASONE) 10 MG tablet Take 1 tablet by mouth daily  GOLDEN Lozano CNP   diazePAM (VALIUM) 2 MG tablet Take 1 tablet by mouth every 12 hours as needed for Anxiety for up to 30 days.   GOLDEN Lozano CNP   meloxicam (MOBIC) 15 MG tablet Take 1 tablet by mouth daily  GOLDEN Lozano CNP   OLANZapine (ZYPREXA) 5 MG tablet Take 1 tablet by mouth nightly  GOLDEN Lozano CNP   FLUoxetine (PROZAC) 40 MG capsule Take 1 capsule by mouth daily  GOLDEN Lozano CNP   atenolol (TENORMIN) 50 MG tablet Take 1 tablet by mouth daily  GOLDEN Lozano CNP   topiramate (TOPAMAX) 25 MG tablet Take 1 tablet by mouth 2 times daily  GOLDEN Lozano CNP [] Abnormal -         Discharge [x]  None visible  [] Abnormal -    HENT:   [x] Normocephalic, atraumatic. [] Abnormal   [x] Mouth/Throat: Mucous membranes are moist.     External Ears [x] Normal  [] Abnormal-     Neck: [x] No visualized mass     Pulmonary/Chest: [x] Respiratory effort normal.  [x] No visualized signs of difficulty breathing or respiratory distress        [x] Abnormal- harsh frequent barky cough     Musculoskeletal:   [x] Normal gait with no signs of ataxia         [x] Normal range of motion of neck        [] Abnormal-       Neurological:        [x] No Facial Asymmetry (Cranial nerve 7 motor function) (limited exam to video visit)          [x] No gaze palsy        [] Abnormal-         Skin:        [x] No significant exanthematous lesions or discoloration noted on facial skin         [] Abnormal-            Psychiatric:       [x] Normal Affect [x] No Hallucinations        [] Abnormal-     Other pertinent observable physical exam findings-     ASSESSMENT/PLAN:  1. Acute bronchitis, unspecified organism  - benzonatate (TESSALON) 100 MG capsule; Take 1-2 capsules by mouth 3 times daily as needed for Cough  Dispense: 42 capsule; Refill: 0  - XR CHEST STANDARD (2 VW); Future  - azithromycin (ZITHROMAX) 250 MG tablet; Take 1 tablet by mouth See Admin Instructions for 5 days 500mg on day 1 followed by 250mg on days 2 - 5  Dispense: 6 tablet; Refill: 0  - albuterol sulfate HFA (VENTOLIN HFA) 108 (90 Base) MCG/ACT inhaler; Inhale 2 puffs into the lungs 4 times daily as needed for Wheezing  Dispense: 1 Inhaler; Refill: 1    2. Suspected COVID-19 virus infection  - she declines testing for COVID  - recommend she con't to quarantine at this point since she is still significantly symptomatic.  - recommend quarantine be lifted when her symptoms start improving      Return if symptoms worsen or fail to improve.     Valerio Crabtree is a 50 y.o. female being evaluated by a Virtual Visit (video visit) encounter to address concerns as mentioned above. A caregiver was present when appropriate. Due to this being a TeleHealth encounter (During RGAQK-93 public health emergency), evaluation of the following organ systems was limited: Vitals/Constitutional/EENT/Resp/CV/GI//MS/Neuro/Skin/Heme-Lymph-Imm. Pursuant to the emergency declaration under the [address] [place] [address], [address] [place] [address] authority and the [employer] Act, this Virtual Visit was conducted with patient's (and/or legal guardian's) consent, to reduce the patient's risk of exposure to COVID-19 and provide necessary medical care. The patient (and/or legal guardian) has also been advised to contact this office for worsening conditions or problems, and seek emergency medical treatment and/or call 911 if deemed necessary. Services were provided through a video synchronous discussion virtually to substitute for in-person clinic visit. Patient and provider were located at their individual homes. --GOLDEN Pena CNP on 12/9/2020 at 11:56 AM    An electronic signature was used to authenticate this note.

## 2020-12-14 ENCOUNTER — PATIENT MESSAGE (OUTPATIENT)
Dept: FAMILY MEDICINE CLINIC | Age: 45
End: 2020-12-14

## 2020-12-14 RX ORDER — PROMETHAZINE HYDROCHLORIDE 25 MG/1
25 TABLET ORAL 4 TIMES DAILY PRN
Qty: 20 TABLET | Refills: 0 | Status: SHIPPED | OUTPATIENT
Start: 2020-12-14 | End: 2020-12-21

## 2020-12-14 RX ORDER — IBUPROFEN 800 MG/1
800 TABLET ORAL 4 TIMES DAILY PRN
Qty: 120 TABLET | Refills: 0 | Status: SHIPPED | OUTPATIENT
Start: 2020-12-14 | End: 2020-12-29 | Stop reason: SDUPTHER

## 2020-12-14 RX ORDER — TOPIRAMATE 25 MG/1
25 TABLET ORAL 2 TIMES DAILY
Qty: 60 TABLET | Refills: 3 | Status: SHIPPED | OUTPATIENT
Start: 2020-12-14 | End: 2020-12-29 | Stop reason: SDUPTHER

## 2020-12-14 RX ORDER — PANTOPRAZOLE SODIUM 40 MG/1
40 TABLET, DELAYED RELEASE ORAL
Qty: 30 TABLET | Refills: 5 | Status: SHIPPED | OUTPATIENT
Start: 2020-12-14 | End: 2020-12-29 | Stop reason: SDUPTHER

## 2020-12-14 RX ORDER — PREDNISONE 20 MG/1
40 TABLET ORAL DAILY
Qty: 14 TABLET | Refills: 0 | Status: SHIPPED | OUTPATIENT
Start: 2020-12-14 | End: 2021-01-15 | Stop reason: SDUPTHER

## 2020-12-14 NOTE — TELEPHONE ENCOUNTER
From: Bree Arciniega  To: GOLDEN Navas CNP  Sent: 12/14/2020 3:29 PM EST  Subject: Prescription Question    Hey one more question. The stomach meds weren't approved. Can we try for the protonix      ----- Message -----   From:GOLDEN Jones CNP   Sent:12/14/2020 10:48 AM EST   To:Diana Talbot   Subject:RE: Prescription Question    That is fine. I have sent your prescriptions to the pharmacy. Hope you get to feeling better. ----- Message -----   From:Diana Talbot   Sent:12/14/2020 9:11 AM EST   To:GOLDEN Jones CNP   Subject:Prescription Question    Hi Dr. Lise Hurtado, I am just wondering if you will call me in some 800 mg motrin to [facility] for the pain in my rib cage. And some steroids. And phenergran.  Thanks so much

## 2020-12-29 ENCOUNTER — PATIENT MESSAGE (OUTPATIENT)
Dept: FAMILY MEDICINE CLINIC | Age: 45
End: 2020-12-29

## 2020-12-29 RX ORDER — BUPROPION HYDROCHLORIDE 150 MG/1
150 TABLET, EXTENDED RELEASE ORAL 2 TIMES DAILY
Qty: 60 TABLET | Refills: 3 | Status: SHIPPED | OUTPATIENT
Start: 2020-12-29

## 2020-12-29 NOTE — TELEPHONE ENCOUNTER
From: Jose A King  To: Claude Members, APRN - CNP  Sent: 12/29/2020 11:25 AM EST  Subject: Prescription Question    Just wondering if you were going to send wellbutrin in for the smoking cessation before I make a trip to [address] [facility].  Thanks

## 2020-12-31 ENCOUNTER — TELEPHONE (OUTPATIENT)
Dept: FAMILY MEDICINE CLINIC | Age: 45
End: 2020-12-31

## 2020-12-31 RX ORDER — FLUCONAZOLE 150 MG/1
150 TABLET ORAL
Qty: 2 TABLET | Refills: 0 | Status: SHIPPED | OUTPATIENT
Start: 2020-12-31 | End: 2021-01-06

## 2021-01-15 ENCOUNTER — PATIENT MESSAGE (OUTPATIENT)
Dept: FAMILY MEDICINE CLINIC | Age: 46
End: 2021-01-15

## 2021-01-15 DIAGNOSIS — R07.89 CHEST WALL PAIN: ICD-10-CM

## 2021-01-15 DIAGNOSIS — J20.9 ACUTE BRONCHITIS, UNSPECIFIED ORGANISM: ICD-10-CM

## 2021-01-15 DIAGNOSIS — F43.10 PTSD (POST-TRAUMATIC STRESS DISORDER): ICD-10-CM

## 2021-01-15 RX ORDER — PREDNISONE 20 MG/1
40 TABLET ORAL DAILY
Qty: 14 TABLET | Refills: 0 | Status: SHIPPED | OUTPATIENT
Start: 2021-01-15 | End: 2021-01-22

## 2021-01-15 RX ORDER — DIAZEPAM 2 MG/1
2 TABLET ORAL EVERY 12 HOURS PRN
Qty: 30 TABLET | Refills: 0 | Status: SHIPPED | OUTPATIENT
Start: 2021-01-15 | End: 2021-02-14

## 2021-01-15 NOTE — TELEPHONE ENCOUNTER
Controlled Substance Monitoring:    Acute and Chronic Pain Monitoring:   RX Monitoring 1/15/2021   Periodic Controlled Substance Monitoring No signs of potential drug abuse or diversion identified.

## 2021-01-29 ENCOUNTER — PATIENT MESSAGE (OUTPATIENT)
Dept: FAMILY MEDICINE CLINIC | Age: 46
End: 2021-01-29

## 2021-01-29 NOTE — TELEPHONE ENCOUNTER
From: Parveen Reid  To: GOLDEN Paiz - CNP  Sent: 1/29/2021 12:28 PM EST  Subject: Prescription Question    Hi Dr. Georgia Garcia, I don't know what is going on with the diazepam. But it's not helping me like I have been prescribed in time past. I'm having anxiety attacks and trouble sleeping. And I have been breaking out in hives. Life has been more stressful over the past little bit. Would you consider raising the dosage?  Thanks

## 2021-02-02 ENCOUNTER — VIRTUAL VISIT (OUTPATIENT)
Dept: FAMILY MEDICINE CLINIC | Age: 46
End: 2021-02-02

## 2021-02-02 DIAGNOSIS — F31.4 BIPOLAR DISORDER, CURRENT EPISODE DEPRESSED, SEVERE, WITHOUT PSYCHOTIC FEATURES (HCC): ICD-10-CM

## 2021-02-02 DIAGNOSIS — F43.10 PTSD (POST-TRAUMATIC STRESS DISORDER): Primary | ICD-10-CM

## 2021-02-02 PROCEDURE — 99442 PR PHYS/QHP TELEPHONE EVALUATION 11-20 MIN: CPT | Performed by: NURSE PRACTITIONER

## 2021-02-02 RX ORDER — FLUOXETINE HYDROCHLORIDE 20 MG/1
20 CAPSULE ORAL DAILY
Qty: 90 CAPSULE | Refills: 0 | Status: SHIPPED | OUTPATIENT
Start: 2021-02-02

## 2021-02-02 RX ORDER — DIAZEPAM 5 MG/1
5 TABLET ORAL EVERY 12 HOURS PRN
Qty: 60 TABLET | Refills: 0 | Status: SHIPPED | OUTPATIENT
Start: 2021-02-02 | End: 2021-04-14 | Stop reason: SDUPTHER
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
## 2021-03-17 ENCOUNTER — TELEPHONE (OUTPATIENT)
Dept: FAMILY MEDICINE CLINIC | Age: 46
End: 2021-03-17

## 2021-03-17 NOTE — TELEPHONE ENCOUNTER
2nd attempt to contact the pt re:overdue Xrays AC ordered on 8/19/20, 9/17/20, & 12/9/20. HIPAA form is up to date, orders mailed.

## 2021-04-09 DIAGNOSIS — F43.10 PTSD (POST-TRAUMATIC STRESS DISORDER): ICD-10-CM

## 2021-04-09 DIAGNOSIS — G43.109 MIGRAINE WITH AURA AND WITHOUT STATUS MIGRAINOSUS, NOT INTRACTABLE: ICD-10-CM

## 2021-04-09 DIAGNOSIS — I10 ESSENTIAL HYPERTENSION: ICD-10-CM

## 2021-04-09 DIAGNOSIS — J20.9 ACUTE BRONCHITIS, UNSPECIFIED ORGANISM: ICD-10-CM

## 2021-04-09 DIAGNOSIS — R07.89 CHEST WALL PAIN: ICD-10-CM

## 2021-04-09 DIAGNOSIS — R10.13 DYSPEPSIA: ICD-10-CM

## 2021-04-09 DIAGNOSIS — F31.4 BIPOLAR DISORDER, CURRENT EPISODE DEPRESSED, SEVERE, WITHOUT PSYCHOTIC FEATURES (HCC): ICD-10-CM

## 2021-04-09 RX ORDER — ALBUTEROL SULFATE 90 UG/1
AEROSOL, METERED RESPIRATORY (INHALATION)
Qty: 1 INHALER | Refills: 2 | Status: SHIPPED | OUTPATIENT
Start: 2021-04-09

## 2021-04-09 RX ORDER — IBUPROFEN 800 MG/1
TABLET ORAL
Qty: 120 TABLET | Refills: 1 | Status: SHIPPED | OUTPATIENT
Start: 2021-04-09

## 2021-04-09 RX ORDER — TOPIRAMATE 25 MG/1
25 TABLET ORAL 2 TIMES DAILY
Qty: 180 TABLET | Refills: 1 | Status: SHIPPED | OUTPATIENT
Start: 2021-04-09

## 2021-04-09 RX ORDER — OLANZAPINE 5 MG/1
5 TABLET ORAL NIGHTLY
Qty: 90 TABLET | Refills: 1 | Status: SHIPPED | OUTPATIENT
Start: 2021-04-09

## 2021-04-09 RX ORDER — FLUOXETINE HYDROCHLORIDE 40 MG/1
40 CAPSULE ORAL DAILY
Qty: 90 CAPSULE | Refills: 1 | Status: SHIPPED | OUTPATIENT
Start: 2021-04-09

## 2021-04-09 RX ORDER — LISINOPRIL AND HYDROCHLOROTHIAZIDE 25; 20 MG/1; MG/1
1 TABLET ORAL DAILY
Qty: 90 TABLET | Refills: 1 | Status: SHIPPED | OUTPATIENT
Start: 2021-04-09

## 2021-04-09 RX ORDER — PANTOPRAZOLE SODIUM 40 MG/1
40 TABLET, DELAYED RELEASE ORAL
Qty: 90 TABLET | Refills: 1 | Status: SHIPPED | OUTPATIENT
Start: 2021-04-09

## 2021-04-14 ENCOUNTER — TELEPHONE (OUTPATIENT)
Dept: FAMILY MEDICINE CLINIC | Age: 46
End: 2021-04-14

## 2021-04-14 DIAGNOSIS — F43.10 PTSD (POST-TRAUMATIC STRESS DISORDER): ICD-10-CM

## 2021-04-14 RX ORDER — DIAZEPAM 5 MG/1
5 TABLET ORAL EVERY 12 HOURS PRN
Qty: 60 TABLET | Refills: 0 | Status: SHIPPED | OUTPATIENT
Start: 2021-04-14 | End: 2021-05-14

## 2021-04-14 NOTE — TELEPHONE ENCOUNTER
Controlled Substance Monitoring:    Acute and Chronic Pain Monitoring:   RX Monitoring 4/14/2021   Periodic Controlled Substance Monitoring No signs of potential drug abuse or diversion identified.

## 2021-04-14 NOTE — TELEPHONE ENCOUNTER
Pt called stating she has been doing some traveling and now has some leg swelling (no pain, redness, tightness, or warm to touch). Pt is asking if she is needing a water pill to help with the swelling due to her traveling.  Pt had stated she previously had taken lasix in the past.      Please advise

## 2021-05-25 ENCOUNTER — NURSE TRIAGE (OUTPATIENT)
Dept: OTHER | Facility: CLINIC | Age: 46
End: 2021-05-25

## 2021-05-25 NOTE — TELEPHONE ENCOUNTER
Reason for Disposition   MODERATE swelling of both ankles (e.g., swelling extends up to the knees) AND new onset or worsening   [1] Swallowing difficulty AND [2] cause unknown (Exception: difficulty swallowing is a chronic symptom)    Answer Assessment - Initial Assessment Questions  1. ONSET: \"When did the swelling start? \" (e.g., minutes, hours, days)      Started while traveling - was worse and now it is getting better   Made 4 trips in 4 days - about 1 month ago     2. LOCATION: \"What part of the leg is swollen? \"  \"Are both legs swollen or just one leg? \"      Ankles on both legs equally -\"nothing like it was\"    3. SEVERITY: \"How bad is the swelling? \" (e.g., localized; mild, moderate, severe)   - Localized - small area of swelling localized to one leg   - MILD pedal edema - swelling limited to foot and ankle, pitting edema < 1/4 inch (6 mm) deep, rest and elevation eliminate most or all swelling   - MODERATE edema - swelling of lower leg to knee, pitting edema > 1/4 inch (6 mm) deep, rest and elevation only partially reduce swelling   - SEVERE edema - swelling extends above knee, facial or hand swelling present       \"very mild now\"    4. REDNESS: \"Does the swelling look red or infected? \"      Denies     5. PAIN: \"Is the swelling painful to touch? \" If so, ask: \"How painful is it? \"   (Scale 1-10; mild, moderate or severe)     Denies     6. FEVER: \"Do you have a fever? \" If so, ask: \"What is it, how was it measured, and when did it start? \"       Denies     7. CAUSE: \"What do you think is causing the leg swelling? \"      Pt feels it is from traveling so much     8. MEDICAL HISTORY: \"Do you have a history of heart failure, kidney disease, liver failure, or cancer? \"      Denies     9. RECURRENT SYMPTOM: \"Have you had leg swelling before? \" If so, ask: \"When was the last time? \" \"What happened that time? \"    Yes - only when traveling     10. OTHER SYMPTOMS: \"Do you have any other symptoms? \" (e.g., chest pain, difficulty breathing)        Loss of appetite - can eat but it has to be mashed potatoes-\"can't eat normal foods\" - has been going on since last thanksgiving - used to be on prilosec      11. PREGNANCY: \"Is there any chance you are pregnant? \" \"When was your last menstrual period? \"        Denies -    Answer Assessment - Initial Assessment Questions  1. SYMPTOM: Yimi Barajas you having difficulty swallowing liquids, solids, or both? \"      Pt states she is having pain with swallowing - unable to swallow solid foods due to the texture- pt states she can swallow liquids and soft foods at this time      2. ONSET: \"When did the swallowing problems begin? \"       Started last thanksgiving     3. CAUSE: \"What do you think is causing the problem? \"       Unsure     4. CHRONIC/RECURRENT: \"Is this a new problem for you? \"  If no, ask: \"How long have you had this problem? \" (e.g., days, weeks, months)       Yes- on medications that are prescribed - States they meds are not helping     5. OTHER SYMPTOMS: \"Do you have any other symptoms? \" (e.g., difficulty breathing, sore throat, swollen tongue, chest pain)      Denies all symptoms     6. PREGNANCY: \"Is there any chance you are pregnant? \" \"When was your last menstrual period? \"      Denies    Protocols used: LEG SWELLING AND EDEMA-ADULT-OH, SWALLOWING DIFFICULTY-ADULT-AH    Received call from Chinyere Gross at pre-service center [facility]) [employer] with [employer]. Brief description of triage: pt reports bilateral ankle swelling after traveling one  month ago- she states the swelling has gone down significantly. She also reports difficulty swallowing and states this is chronic and she is on \"two medications that are not helping\" and this swallowing problem has occurred since last thanksgiving. Triage indicates for patient to See in office today.  Advised pt to follow up in [address] if unable to get appointment today- Discussed that prolonging medical care could potentially be life threatening with the current symptoms pt is reporting. Pt states she would like to follow up with an appointment tomorrow. Care advice provided, patient verbalizes understanding; denies any other questions or concerns; instructed to call back for any new or worsening symptoms. Writer provided warm transfer to Martha August at [facility] for appointment scheduling. Attention Provider: Thank you for allowing me to participate in the care of your patient. The patient was connected to triage in response to information provided to the E[facility]. Please do not respond through this encounter as the response is not directed to a shared pool.

## 2021-05-25 NOTE — TELEPHONE ENCOUNTER
Pt okay with seeing aidan tomorrow.       Future Appointments   Date Time Provider Eunice Breen   5/26/2021 10:20 AM GOLDEN Mcqueen

## 2021-07-01 ENCOUNTER — TELEPHONE (OUTPATIENT)
Dept: FAMILY MEDICINE CLINIC | Age: 46
End: 2021-07-01

## 2021-07-01 NOTE — TELEPHONE ENCOUNTER
PATIENT DISMISSED FROM [facility] 05.27.2021  Patient has been informed and voiced understanding

## 2021-07-01 NOTE — TELEPHONE ENCOUNTER
---------------------------------------------------------------------------  --------------,  Name of Medication? topiramate (TOPAMAX) 25 MG tablet  Patient-reported dosage and instructions? 1 25MG tablet daily  How many days do you have left? 0  Preferred Pharmacy? [facility] #[id number]  Pharmacy phone number (if available)? [phone]  Additional Information for Provider? Patient would like to know if the   doctor would refill these so she has medications to last her until she is   able to establish care with another PCP. Patient is working to get another   doctor and thanks the doctor for his time. ---------------------------------------------------------------------------  --------------,  Name of Medication? buPROPion (WELLBUTRIN SR) 150 MG extended release   tablet  Patient-reported dosage and instructions? 1 150MG tablet daily   How many days do you have left? 0  Preferred Pharmacy? [facility] #[id number]  Pharmacy phone number (if available)? [phone]  Additional Information for Provider? Patient would like to know if the   doctor would refill these so she has medications to last her until she is   able to establish care with another PCP. Patient is working to get another   doctor and thanks the doctor for his time. ---------------------------------------------------------------------------  --------------,  Name of Medication? benzonatate (TESSALON) 100 MG capsule  Patient-reported dosage and instructions? Unsure  How many days do you have left? 0  Preferred [facility]acy? [id number]  Pharmacy phone number (if available)? [phone]  Additional Information for Provider? Patient would like to know if the   doctor would refill these so she has medications to last her until she is   able to establish care with another PCP. Patient is working to get another   doctor and thanks the doctor for his time. ---------------------------------------------------------------------------  --------------,  Name of Medication? FLUoxetine (PROZAC) 40 MG capsule  Patient-reported dosage and instructions? 1 40MG capsule daily   How many days do you have left? 0  [facility]erred [facility]? [facility] #[id number]  Pharmacy phone number (if available)? [phone]  Additional Information for Provider? Patient would like to know if the   doctor would refill these so she has medications to last her until she is   able to establish care with another PCP. Patient is working to get another   doctor and thanks the doctor for his time. ---------------------------------------------------------------------------  --------------,  Name of Medication? lisinopril-hydroCHLOROthiazide (PRINZIDE;ZESTORETIC)   20-25 MG per tablet  Patient-reported dosage and instructions? 1 20-25MG per tablet daily  How many days do you have left? 0  [facility] #[id number]  Pharmacy phone number (if available)? [phone]  Additional Information for Provider? Patient would like to know if the   doctor would refill these so she has medications to last her until she is   able to establish care with another PCP. Patient is working to get another   doctor and thanks the doctor for his time. ---------------------------------------------------------------------------  --------------,  Name of Medication? atenolol (TENORMIN) 50 MG tablet  Patient-reported dosage and instructions? 1 50MG tablet daily  How many days do you have left? 0  [facility]erred [facility]? [id number]  Pharmacy phone number (if available)? [phone]  Additional Information for Provider? Patient would like to know if the   doctor would refill these so she has medications to last her until she is   able to establish care with another PCP. Patient is working to get another   doctor and thanks the doctor for his time. ---------------------------------------------------------------------------  --------------  Blair Sharmazen INFO  What is the best way for the office to contact you? OK to leave message on   voicemail  Preferred Call Back Phone Number?  [phone]